# Patient Record
Sex: FEMALE | Race: ASIAN | NOT HISPANIC OR LATINO | Employment: STUDENT | URBAN - METROPOLITAN AREA
[De-identification: names, ages, dates, MRNs, and addresses within clinical notes are randomized per-mention and may not be internally consistent; named-entity substitution may affect disease eponyms.]

---
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
## 2022-05-26 ENCOUNTER — OFFICE VISIT (OUTPATIENT)
Dept: FAMILY MEDICINE | Facility: CLINIC | Age: 22
End: 2022-05-26
Payer: COMMERCIAL

## 2022-05-26 VITALS
SYSTOLIC BLOOD PRESSURE: 116 MMHG | DIASTOLIC BLOOD PRESSURE: 77 MMHG | BODY MASS INDEX: 30 KG/M2 | RESPIRATION RATE: 20 BRPM | WEIGHT: 163 LBS | TEMPERATURE: 98.6 F | OXYGEN SATURATION: 100 % | HEART RATE: 61 BPM | HEIGHT: 62 IN

## 2022-05-26 DIAGNOSIS — R07.89 CHEST WALL PAIN: ICD-10-CM

## 2022-05-26 DIAGNOSIS — A08.4 VIRAL GASTROENTERITIS: Primary | ICD-10-CM

## 2022-05-26 PROCEDURE — 99213 OFFICE O/P EST LOW 20 MIN: CPT | Mod: GC | Performed by: STUDENT IN AN ORGANIZED HEALTH CARE EDUCATION/TRAINING PROGRAM

## 2022-05-26 NOTE — PROGRESS NOTES
"  Assessment & Plan      (A08.4) Viral gastroenteritis  (primary encounter diagnosis)  (R07.89) Chest wall pain  Comment: Likely viral GI illness last Saturday. This fits well with her hx of acute onset of multiple episodes of nonbloody vomiting which resolved within 1 day. The multiple episodes of vomiting likely led to a muscle strain or irritation of gastric or esophageal lining which led to her pleuritic-like chest pain. Her Wells criteria is 0 and there is overall very low concern for PE. Also have very low concern for pericarditis or myocarditis as she is already feeling better and has not had any SOB. She was informed that she should seek medical attention if she develops worsening of symptoms, fever, etc.    Plan: omeprazole (PRILOSEC) 20 MG DR capsule    BMI:   Estimated body mass index is 29.81 kg/m  as calculated from the following:    Height as of this encounter: 1.575 m (5' 2\").    Weight as of this encounter: 73.9 kg (163 lb).     Precepted with Dr. Kaylan Bouderaux MS4    Resident/Fellow Attestation   I, Luiz Simmons MD, was present with the medical/[employer] student who participated in the service and in the documentation of the note.  I have verified the history and personally performed the physical exam and medical decision making.  I agree with the assessment and plan of care as documented in the note.      Luiz Simmons MD  PGY3      Wendy Mcdowell is a 21 year old who presents for the following health issues     HPI     Was sick this past Saturday AM. Threw up everything she was eating which resolved by Sunday morning. Then she began having dull/tight chest pain whenever breathing in or out or laughing. She says there is pain when she presses on her epigastric area. The pain does not radiate. Lying down made the pain better. She has been using ibuprofen occasionally and it has helped somewhat. The last time she had some pain was Wednesday AM. On Wednesday AM, she " thought she was having a heart attack due to having a severe squeezing pain that lasted about 1-2 minutes. The pain is much improved today. No SOB, cough, fever, chills, or body aches. No issues with breathing. No family hx of early onset heart disease. She has one brother with breathing issues. No problems with exertion.     Review of Systems   Constitutional, cardiovascular, and pulmonary systems are negative, except as otherwise noted.      Objective    There were no vitals taken for this visit.  There is no height or weight on file to calculate BMI.  Physical Exam   GENERAL: healthy, alert and no distress  EYES: Eyes grossly normal to inspection, PERRL and conjunctivae and sclerae normal  RESP: lungs clear to auscultation - no rales, rhonchi or wheezes  CV: regular rate and rhythm, normal S1 S2, no S3 or S4, no murmur, click or rub  MS: no gross musculoskeletal defects noted, no edema, pain near epigastric region that is reproducible when she presses on it  SKIN: no suspicious lesions or rashes  NEURO: mentation intact and speech normal  PSYCH: mentation appears normal, affect normal/bright  Throat: mild erythema in posterior pharynx

## 2022-05-26 NOTE — PATIENT INSTRUCTIONS
-Feel free to use over the counter TUMS to help with your pain until the omeprazole kicks in the next two days (can use 1-2 tablets up to four times a day)    -Feel free to use over the counter ibuprofen the next couple of days but be sure to take it with some food.